# Patient Record
Sex: FEMALE | Race: WHITE
[De-identification: names, ages, dates, MRNs, and addresses within clinical notes are randomized per-mention and may not be internally consistent; named-entity substitution may affect disease eponyms.]

---

## 2021-03-04 ENCOUNTER — HOSPITAL ENCOUNTER (EMERGENCY)
Dept: HOSPITAL 46 - ED | Age: 31
Discharge: HOME | End: 2021-03-04
Payer: OTHER GOVERNMENT

## 2021-03-04 VITALS — BODY MASS INDEX: 27.81 KG/M2 | HEIGHT: 65 IN | WEIGHT: 166.89 LBS

## 2021-03-04 DIAGNOSIS — J40: Primary | ICD-10-CM

## 2021-03-04 DIAGNOSIS — Z91.030: ICD-10-CM

## 2021-03-04 PROCEDURE — C9803 HOPD COVID-19 SPEC COLLECT: HCPCS

## 2021-03-04 PROCEDURE — U0003 INFECTIOUS AGENT DETECTION BY NUCLEIC ACID (DNA OR RNA); SEVERE ACUTE RESPIRATORY SYNDROME CORONAVIRUS 2 (SARS-COV-2) (CORONAVIRUS DISEASE [COVID-19]), AMPLIFIED PROBE TECHNIQUE, MAKING USE OF HIGH THROUGHPUT TECHNOLOGIES AS DESCRIBED BY CMS-2020-01-R: HCPCS

## 2022-10-13 ENCOUNTER — HOSPITAL ENCOUNTER (EMERGENCY)
Dept: HOSPITAL 46 - ED | Age: 32
LOS: 1 days | Discharge: HOME | End: 2022-10-14
Payer: COMMERCIAL

## 2022-10-13 VITALS — BODY MASS INDEX: 27.66 KG/M2 | WEIGHT: 166.01 LBS | HEIGHT: 65 IN

## 2022-10-13 DIAGNOSIS — Z3A.08: ICD-10-CM

## 2022-10-13 DIAGNOSIS — O21.9: Primary | ICD-10-CM

## 2022-10-13 DIAGNOSIS — Z91.048: ICD-10-CM

## 2022-10-13 DIAGNOSIS — Z91.030: ICD-10-CM

## 2022-10-13 PROCEDURE — A9270 NON-COVERED ITEM OR SERVICE: HCPCS

## 2023-05-15 ENCOUNTER — HOSPITAL ENCOUNTER (INPATIENT)
Dept: HOSPITAL 46 - FBC | Age: 33
LOS: 2 days | Discharge: HOME | End: 2023-05-17
Attending: GENERAL PRACTICE | Admitting: OBSTETRICS & GYNECOLOGY
Payer: COMMERCIAL

## 2023-05-15 VITALS — WEIGHT: 165 LBS | HEIGHT: 66 IN | BODY MASS INDEX: 26.52 KG/M2

## 2023-05-15 VITALS — DIASTOLIC BLOOD PRESSURE: 77 MMHG | SYSTOLIC BLOOD PRESSURE: 139 MMHG

## 2023-05-15 DIAGNOSIS — Z87.891: ICD-10-CM

## 2023-05-15 DIAGNOSIS — D62: ICD-10-CM

## 2023-05-15 DIAGNOSIS — F12.90: ICD-10-CM

## 2023-05-15 DIAGNOSIS — Z67.20: ICD-10-CM

## 2023-05-15 DIAGNOSIS — O36.5930: Primary | ICD-10-CM

## 2023-05-15 DIAGNOSIS — Z3A.38: ICD-10-CM

## 2023-05-15 PROCEDURE — 10907ZC DRAINAGE OF AMNIOTIC FLUID, THERAPEUTIC FROM PRODUCTS OF CONCEPTION, VIA NATURAL OR ARTIFICIAL OPENING: ICD-10-PCS | Performed by: OBSTETRICS & GYNECOLOGY

## 2023-05-15 PROCEDURE — 00HU33Z INSERTION OF INFUSION DEVICE INTO SPINAL CANAL, PERCUTANEOUS APPROACH: ICD-10-PCS | Performed by: OBSTETRICS & GYNECOLOGY

## 2023-05-15 PROCEDURE — 3E0P7VZ INTRODUCTION OF HORMONE INTO FEMALE REPRODUCTIVE, VIA NATURAL OR ARTIFICIAL OPENING: ICD-10-PCS | Performed by: OBSTETRICS & GYNECOLOGY

## 2023-05-15 PROCEDURE — A9270 NON-COVERED ITEM OR SERVICE: HCPCS

## 2023-05-15 PROCEDURE — 3E033VJ INTRODUCTION OF OTHER HORMONE INTO PERIPHERAL VEIN, PERCUTANEOUS APPROACH: ICD-10-PCS | Performed by: OBSTETRICS & GYNECOLOGY

## 2023-05-15 PROCEDURE — 3E0R3BZ INTRODUCTION OF ANESTHETIC AGENT INTO SPINAL CANAL, PERCUTANEOUS APPROACH: ICD-10-PCS | Performed by: OBSTETRICS & GYNECOLOGY

## 2023-05-15 NOTE — PR
[facility]
                                    [address] [place], [place]  [address]
_________________________________________________________________________________________
                                                                 Signed   
 
 
===================================
Progress Notes IP
===================================
Datetime Report Generated by CPN: 05/15/2023 18:05
   
PROGRESS NOTES:  [id number]
Impression:  Normal Progression of Labor; Reassuring Fetal Heart Rate
Procedures:  Artificial ROM
Plan:  Continue Present Management
VITAL SIGNS:  [id number]
Vital Signs:  Reviewed; Within Normal Limits
EXAM:  [id number]
Dilatation:  4.0
Effacement:  80
Station:  -3
Contractions:  q1 - 3.5
MEMBRANES:  [id number]
Membranes Status:  Ruptured
Comments:  Progressing well on pitocin, pt breathing through contractions. Amniotomy
   performed yielding copious amount of clear fluid. Fetal hand palpable initially on
   contraction following rupture, reduced, no longer palpable on recheck after 2
   additional contractions. 
FETUS A:  [id number]
FHR Baseline:  125
Variability:  Moderate 6-25bpm
Accelerations:  15X15
FHR Category:  Category I
Presentation:  Vertex
Comments on Fetus A:  No evidence of fetal acidemia
FETUS B:  [id number]
Signing Physician:  Chris Caraballo DO
 
 
Copies:                                
~
 
 
 
 
 
 
 
 
*Electronically Signed*  05/15/23  1809  CHRIS CARABALLO DO           
                                                                       
_________________________________________________________________________________________
PATIENT NAME:     DINA MENDEZ                 
MEDICAL RECORD #: [id number]                     PROGRESS NOTE                 
          ACCT #: [id number]  
DATE OF BIRTH:   [birth year]                                       
PHYSICIAN:   CHRIS CARABALLO #: [phone]
REPORT IS CONFIDENTIAL AND NOT TO BE RELEASED WITHOUT AUTHORIZATION

## 2023-05-15 NOTE — PR
[facility]
                                    [address], [place]  [address]
_________________________________________________________________________________________
                                                                 Signed   
 
 
===================================
Progress Notes IP
===================================
Datetime Report Generated by CPN: 05/15/2023 14:17
   
PROGRESS NOTES:  [id number]
Impression:  Normal Progression of Labor; Reassuring Fetal Heart Rate
Plan:  Continue Present Management
VITAL SIGNS:  [id number]
Vital Signs:  Reviewed; Within Normal Limits
EXAM:  [id number]
Dilatation:  3.0
Effacement:  70
Station:  -3
Contractions:  q1 - 3.5
MEMBRANES:  [id number]
Membranes Status:  Intact
Comments:  Pt feeling contractions "more in the front" instead of side. Reviewed next
   steps, in particular amniotomy vs low-dose pitocin. Pt strongly prefers low-dose
   pitocin, discussed if contractions are not tracing well or if concern for tachysystole
   may need to perform amniotomy with placement of IUPC. Pt verbalizes understanding. 
FETUS A:  [id number]
FHR Baseline:  125
Variability:  Moderate 6-25bpm
Accelerations:  15X15
FHR Category:  Category I
Presentation:  Vertex
Comments on Fetus A:  No evidence of fetal acidemia
FETUS B:  [id number]
Signing Physician:  Chris Caraballo DO
 
 
Copies:                                
~
 
 
 
 
 
 
 
 
 
*Electronically Signed*  05/15/23  1417  CHRIS CARABALLO DO           
                                                                       
_________________________________________________________________________________________
PATIENT NAME:     DINA MENDEZ                 
MEDICAL RECORD #: [id number]                     PROGRESS NOTE                 
          ACCT #: [id number]  
DATE OF BIRTH:   [birth year]                                       
PHYSICIAN:   CHRIS CARABALLO DO                  [facility] #: [id number]
REPORT IS CONFIDENTIAL AND NOT TO BE RELEASED WITHOUT AUTHORIZATION

## 2023-05-15 NOTE — PR
[facility]
                                    [address] [facility]
                                  [place], [place]  [address]
_________________________________________________________________________________________
                                                                 Signed   
 
 
===================================
Progress Notes IP
===================================
Datetime Report Generated by CPN: 05/15/2023 12:36
   
PROGRESS NOTES:  [id number]
Impression:  Normal Progression of Labor; Reassuring Fetal Heart Rate
Plan:  Continue Present Management
VITAL SIGNS:  [id number]
Vital Signs:  Reviewed; Within Normal Limits
EXAM:  [id number]
Dilatation:  2.0
Effacement:  90
Station:  -3
Contractions:  q1 - 3.5
MEMBRANES:  [id number]
Comments:  Second IV access being established by PACU nurses, under US guidance, with
   difficulty. Exam deferred. 
   Pt reports contractions are slowly becoming stronger. Anticipate recheck at 1400.
FETUS A:  [id number]
FHR Baseline:  125
Variability:  Moderate 6-25bpm
Accelerations:  15X15
FHR Category:  Category I
Presentation:  Vertex
Comments on Fetus A:  No evidence of fetal acidemia
FETUS B:  [id number]
Signing Physician:  Chris Caraballo DO
 
 
Copies:                                
~
 
 
 
 
 
 
 
 
 
 
 
*Electronically Signed*  05/15/23  1236  CHRIS CARABALLO DO           
                                                                       
_________________________________________________________________________________________
PATIENT NAME:     DINA MENDEZ                 
MEDICAL RECORD #: [id number]                     PROGRESS NOTE                 
          ACCT #: [id number]  
DATE OF BIRTH:   [birth year]                                       
PHYSICIAN:   CHRIS CARABALLO DO                  RPT #: [id number]
REPORT IS CONFIDENTIAL AND NOT TO BE RELEASED WITHOUT AUTHORIZATION

## 2023-05-16 NOTE — PR
[facility]
                                    [address] [place], [place]  [address]
_________________________________________________________________________________________
                                                                 Signed   
 
 
===================================
PP Progress Notes
===================================
Datetime Report Generated by CPN: 2023 08:15
   
SUBJECTIVE:  [id number]
Pain:  Within Normal Limits
Nausea/Vomiting:  Denies
Flatus:  Yes
Vital Signs:  [id number]
Vital Signs:  Reviewed; Within Normal Limits
POSTPARTUM EXAM:  Ongoing
Cardiovascular:  Normal
Respiratory:  Normal
Abdomen/Uterus:  Normal
Lochia:  Normal
Breasts:  Normal
Extremities:  Normal
Breastfeeding Progress:  Normal
Exam Comments:  NAD
   RRR
   No dyspnea/ retractions
   Abd SNTND, FF @ U
   Ext: right calf tender to palpation, +Eugene's, negative eugene's on left without TTP.
   Trace edema. 
      
IMPRESSION/PLAN/PROCEDURES:  [id number]
Impression:  Normal Postpartum Progression
Plan:  Continue Present Management
Progress Notes:  PPD#1 s/p 
   -MIOL for FGR
   -Progressing well postpartum: ambulating, voiding, tolerating regular diet, lochia
   light, pain well-controlled with oral. Denies breastfeeding difficulties, anticipate
   IUD for contraception. 
   -hgb 9.1 from 10.8, risks/ benefits/ alternatives for IV iron infusion discussed, pt
   elected to proceed
   NG/CT missed during prenatal care, collected on urine with patient consent today
      
   Anticipate DC to home tomorrow
Signing Physician:  Chris Caraballo DO
 
 
 
*Electronically Signed*  23  0815  CHRIS CARABALLO DO           
                                                                       
_________________________________________________________________________________________
PATIENT NAME:     DINA MENDEZ                 
MEDICAL RECORD #: [id number]                     PROGRESS NOTE                 
          ACCT #: [id number]  
DATE OF BIRTH:   [birth year]                                       
PHYSICIAN:   CHRIS CARABALLO DO                  RPT #: [id number]
REPORT IS CONFIDENTIAL AND NOT TO BE RELEASED WITHOUT AUTHORIZATION
 
 
                                  [facility]
                                    [address] [place] [address]  [address]
_________________________________________________________________________________________
                                                                 Signed   
 
 
Copies:                                
~
 
 
 
 
 
 
 
 
 
 
 
 
 
 
 
 
 
 
 
 
 
 
 
 
 
 
 
 
 
 
 
 
 
 
 
 
 
 
 
 
 
*Electronically Signed*  23  0815  CHRIS CARABALLO DO           
                                                                       
_________________________________________________________________________________________
PATIENT NAME:     DINA MENDEZ                 
MEDICAL RECORD #: [id number]                     PROGRESS NOTE                 
          ACCT #: [id number]  
DATE OF BIRTH:   [birth year]                                       
PHYSICIAN:   CHRIS CARABALLO DO                  RPT #: [id number]
REPORT IS CONFIDENTIAL AND NOT TO BE RELEASED WITHOUT AUTHORIZATION

## 2023-05-17 NOTE — PR
[facility]
                                    [address] [facility]
                                  [place], [place]  [address]
_________________________________________________________________________________________
                                                                 Signed   
 
 
===================================
PP Progress Notes
===================================
Datetime Report Generated by CPN: 05/17/2023 08:19
   
SUBJECTIVE:  [id number]
Pain:  Within Normal Limits
Nausea/Vomiting:  Denies
Flatus:  Yes
Vital Signs:  [id number]
Vital Signs:  Reviewed; Within Normal Limits
POSTPARTUM EXAM:  Ongoing
Cardiovascular:  Normal
Respiratory:  Normal
Abdomen/Uterus:  Normal
Lochia:  Normal
Breasts:  Normal
Extremities:  Normal
Breastfeeding Progress:  Normal
Exam Comments:  NAD
   RRR
   No dyspnea/ retractions
   Abd SNTND, FF @ U
   Ext: right calf tender to palpation, +Eugene's, negative eugene's on left without TTP.
   Trace edema. 
      
IMPRESSION/PLAN/PROCEDURES:  [id number]
Impression:  Normal Postpartum Progression
Plan:  Discharge
Procedures:  None
Progress Notes:  Doing well, without complaint.  Ready to go home.
Signing Physician:  Diallo Ojeda MD
 
 
Copies:                                
~
 
 
 
 
 
 
 
*Electronically Signed*  05/17/23 0819  DIALLO OJEDA MD           
                                                                       
_________________________________________________________________________________________
PATIENT NAME:     DINA MENDEZ                 
MEDICAL RECORD #: [id number]                     PROGRESS NOTE                 
          ACCT #: [id number]  
DATE OF BIRTH:   [birth year]                                       
PHYSICIAN:   DIALLO OJEDA MD                  RPT #: [id number]
REPORT IS CONFIDENTIAL AND NOT TO BE RELEASED WITHOUT AUTHORIZATION